# Patient Record
Sex: MALE | Race: ASIAN | Employment: FULL TIME | URBAN - METROPOLITAN AREA
[De-identification: names, ages, dates, MRNs, and addresses within clinical notes are randomized per-mention and may not be internally consistent; named-entity substitution may affect disease eponyms.]

---

## 2023-12-30 ENCOUNTER — OFFICE VISIT (OUTPATIENT)
Dept: URGENT CARE | Facility: MEDICAL CENTER | Age: 23
End: 2023-12-30
Payer: COMMERCIAL

## 2023-12-30 ENCOUNTER — APPOINTMENT (OUTPATIENT)
Dept: RADIOLOGY | Facility: MEDICAL CENTER | Age: 23
End: 2023-12-30
Payer: COMMERCIAL

## 2023-12-30 VITALS
DIASTOLIC BLOOD PRESSURE: 59 MMHG | HEIGHT: 69 IN | HEART RATE: 64 BPM | TEMPERATURE: 96.8 F | RESPIRATION RATE: 16 BRPM | SYSTOLIC BLOOD PRESSURE: 124 MMHG | OXYGEN SATURATION: 100 %

## 2023-12-30 DIAGNOSIS — S00.33XA CONTUSION OF NOSE, INITIAL ENCOUNTER: ICD-10-CM

## 2023-12-30 DIAGNOSIS — S09.92XA INJURY OF NOSE, INITIAL ENCOUNTER: ICD-10-CM

## 2023-12-30 DIAGNOSIS — S02.2XXA CLOSED FRACTURE OF NASAL BONE, INITIAL ENCOUNTER: Primary | ICD-10-CM

## 2023-12-30 PROCEDURE — 99213 OFFICE O/P EST LOW 20 MIN: CPT | Performed by: FAMILY MEDICINE

## 2023-12-30 PROCEDURE — 70160 X-RAY EXAM OF NASAL BONES: CPT

## 2023-12-30 NOTE — PATIENT INSTRUCTIONS
Nasal Contusion   WHAT YOU NEED TO KNOW:   A nasal contusion is a bruise that appears on your nose after an injury. A bruise happens when small blood vessels tear but skin does not. When blood vessels tear, blood leaks into surrounding tissue, such as soft tissue or muscle. You may develop swelling and bruising around your eyes and cheeks.   DISCHARGE INSTRUCTIONS:   Return to the emergency department if:   You have a fever.    You cannot breathe through your nostrils.     You have bleeding from your nose that does not stop when you apply pressure to your nose.     You notice a change in the shape of your nose.     You have watery, clear fluid draining from your nose.     You have tingling or numbness in or near the injured area.    You have any changes in your vision.    Contact your healthcare provider if:   Your symptoms do not improve with treatment.    You have questions or concerns about your condition or care.    Medicines:   Acetaminophen  decreases pain. It is available without a doctor's order. Ask how much to take and how often to take it. Follow directions. Acetaminophen can cause liver damage if not taken correctly.    Take your medicine as directed.  Contact your healthcare provider if you think your medicine is not helping or if you have side effects. Tell your provider if you are allergic to any medicine. Keep a list of the medicines, vitamins, and herbs you take. Include the amounts, and when and why you take them. Bring the list or the pill bottles to follow-up visits. Carry your medicine list with you in case of an emergency.    Ice:  Apply ice on your bruise for 15 to 20 minutes every hour or as directed. Use an ice pack, or put crushed ice in a plastic bag. Cover it with a towel. Ice helps decrease tissue damage and decreases swelling and pain.  Elevation:  Sleep with your head elevated to help decrease swelling.   Help your contusion heal:  Do not massage the area or put heating pads or other  warming devices on the bruise right after your injury. Heat and massage may slow the healing of the area.  Follow up with your healthcare provider as directed:  You may need to return within a week to have your injury checked again. Write down any questions you have so you remember to ask them in your follow-up visits.  Prevent a nasal contusion:   Use safety belts and child restraints.     Use safety helmets when you ride a bicycle or motorcycle.     Use a mouth and face guard during sports.    © Copyright [employer] 2023 Information is for End User's use only and may not be sold, redistributed or otherwise used for commercial purposes.  The above information is an  only. It is not intended as medical advice for individual conditions or treatments. Talk to your doctor, nurse or pharmacist before following any medical regimen to see if it is safe and effective for you.

## 2023-12-30 NOTE — PROGRESS NOTES
[facility]        NAME: Marc Salazar is a 23 y.o. male  : 2000    MRN: [id number]  DATE: 2023  TIME: 3:45 PM    Assessment and Plan   Closed fracture of nasal bone, initial encounter [S02.2XXA]  1. Closed fracture of nasal bone, initial encounter  Ambulatory Referral to Otolaryngology      2. Injury of nose, initial encounter  XR nasal bones      3. Contusion of nose, initial encounter          Nasal bone xray: + fractures of nasal bone tip  Refer ENT for follow up  Avoid any sports activity for the next 4 weeks    Patient Instructions     Follow up with PCP in 3-5 days if symptoms worsen.    Chief Complaint     Chief Complaint   Patient presents with   • Facial Injury     Pt reports injury on nose from playing soccer. Pt states there is no pain.      History of Present Illness   HPI  Marc Salazar is a 23 y.o. male  who presented to [facility] today with history of injury to his nose while playing soccer yesterday.  Patient states that he was elbowed on his nose and immediately had pain and bleeding from the nose.  Also this morning he noticed his nose is more swollen and again had bleeding from his nose.  No previous nasal injury.  He is able to breathe through the nose, no visual changes.    Review of Systems   Review of Systems   Constitutional:  Negative for chills and fever.   HENT:  Positive for nosebleeds. Negative for congestion, rhinorrhea and sore throat.    Respiratory:  Negative for cough and shortness of breath.    Cardiovascular:  Negative for chest pain.   Gastrointestinal:  Negative for diarrhea, nausea and vomiting.   Skin:  Negative for rash.   Neurological:  Negative for dizziness and headaches.         Current Medications     No current outpatient medications on file.    Current Allergies     Allergies as of 2023   • (Not on File)            The following portions of the patient's history were reviewed and updated as appropriate: allergies, current  "medications, past family history, past medical history, past social history, past surgical history and problem list.     No past medical history on file.    No past surgical history on file.    No family history on file.    Medications have been verified.      Objective   /59   Pulse 64   Temp (!) 96.8 °F (36 °C)   Resp 16   Ht 5' 9\" (1.753 m)   SpO2 100%   No LMP for male patient.       Physical Exam     Physical Exam  Vitals and nursing note reviewed.   Constitutional:       Appearance: He is well-developed.   HENT:      Head: Normocephalic.      Right Ear: External ear normal.      Left Ear: External ear normal.      Nose: Signs of injury and nasal tenderness present. No septal deviation.      Comments: + Ecchymosis nasal bridge, + tenderness, + moderate swelling  No crepitus, skin intact, nasal bone midline/no deviation  No active nosebleed, + erythematous left nasal turbinates     Mouth/Throat:      Mouth: Mucous membranes are moist.   Eyes:      Conjunctiva/sclera: Conjunctivae normal.   Cardiovascular:      Rate and Rhythm: Normal rate.   Pulmonary:      Effort: Pulmonary effort is normal. No respiratory distress.   Neurological:      Mental Status: He is alert and oriented to person, place, and time.   Psychiatric:         Mood and Affect: Mood normal.         Behavior: Behavior normal.                   "